# Patient Record
Sex: MALE | Race: WHITE
[De-identification: names, ages, dates, MRNs, and addresses within clinical notes are randomized per-mention and may not be internally consistent; named-entity substitution may affect disease eponyms.]

---

## 2017-04-12 ENCOUNTER — HOSPITAL ENCOUNTER (EMERGENCY)
Dept: HOSPITAL 26 - MED | Age: 22
LOS: 1 days | Discharge: HOME | End: 2017-04-13
Payer: COMMERCIAL

## 2017-04-12 VITALS — SYSTOLIC BLOOD PRESSURE: 120 MMHG | DIASTOLIC BLOOD PRESSURE: 68 MMHG

## 2017-04-12 VITALS — BODY MASS INDEX: 25.9 KG/M2 | HEIGHT: 71 IN | WEIGHT: 185 LBS

## 2017-04-12 DIAGNOSIS — J45.909: ICD-10-CM

## 2017-04-12 DIAGNOSIS — G40.909: Primary | ICD-10-CM

## 2017-04-12 LAB
ALBUMIN FLD-MCNC: 4.1 G/DL (ref 3.4–5)
ALP SERPL-CCNC: 124 U/L (ref 46–116)
ALT SERPL-CCNC: 36 U/L (ref 12–78)
ANION GAP SERPL CALCULATED.3IONS-SCNC: 11.7 MMOL/L (ref 8–16)
AST SERPL-CCNC: 28 U/L (ref 15–37)
BASOPHILS # BLD AUTO: 0.2 K/UL (ref 0–0.22)
BASOPHILS NFR BLD AUTO: 1.8 % (ref 0–2)
BILIRUB SERPL-MCNC: 0.3 MG/DL (ref 0–1)
BUN SERPL-MCNC: 14 MG/DL (ref 7–18)
CALCIUM SPEC-MCNC: 8.6 MG/DL (ref 8.5–10.1)
CHLORIDE SERPL-SCNC: 107 MMOL/L (ref 98–107)
CO2 SERPL-SCNC: 28 MMOL/L (ref 21–32)
CREAT SERPL-MCNC: 0.8 MG/DL (ref 0.6–1.3)
EOSINOPHIL # BLD AUTO: 0.2 K/UL (ref 0–0.4)
EOSINOPHIL NFR BLD AUTO: 2 % (ref 0–4)
ERYTHROCYTE [DISTWIDTH] IN BLOOD BY AUTOMATED COUNT: 11.6 % (ref 11.6–13.7)
GFR SERPL CREATININE-BSD FRML MDRD: 155 ML/MIN (ref 90–?)
GLUCOSE SERPL-MCNC: 112 MG/DL (ref 74–106)
HCT VFR BLD AUTO: 43.9 % (ref 36–52)
HGB BLD-MCNC: 15 G/DL (ref 12–18)
LYMPHOCYTES # BLD AUTO: 0.9 K/UL (ref 2–11.5)
LYMPHOCYTES NFR BLD AUTO: 8.5 % (ref 20.5–51.1)
MCH RBC QN AUTO: 31 PG (ref 27–31)
MCHC RBC AUTO-ENTMCNC: 34 G/DL (ref 33–37)
MCV RBC AUTO: 90 FL (ref 80–94)
MONOCYTES # BLD AUTO: 0.7 K/UL (ref 0.8–1)
MONOCYTES NFR BLD AUTO: 6.5 % (ref 1.7–9.3)
NEUTROPHILS # BLD AUTO: 8.3 K/UL (ref 1.8–7.7)
NEUTROPHILS NFR BLD AUTO: 81.2 % (ref 42.2–75.2)
PLATELET # BLD AUTO: 144 K/UL (ref 140–450)
POTASSIUM SERPL-SCNC: 3.7 MMOL/L (ref 3.5–5.1)
PROT SERPL-MCNC: 7.1 G/DL (ref 6.4–8.2)
RBC # BLD AUTO: 4.87 MIL/UL (ref 4.2–6.1)
SODIUM SERPL-SCNC: 143 MMOL/L (ref 136–145)
WBC # BLD AUTO: 10.3 K/UL (ref 4.8–10.8)

## 2017-04-12 PROCEDURE — 80305 DRUG TEST PRSMV DIR OPT OBS: CPT

## 2017-04-12 PROCEDURE — 70450 CT HEAD/BRAIN W/O DYE: CPT

## 2017-04-12 PROCEDURE — 96375 TX/PRO/DX INJ NEW DRUG ADDON: CPT

## 2017-04-12 PROCEDURE — 85025 COMPLETE CBC W/AUTO DIFF WBC: CPT

## 2017-04-12 PROCEDURE — 36415 COLL VENOUS BLD VENIPUNCTURE: CPT

## 2017-04-12 PROCEDURE — 96365 THER/PROPH/DIAG IV INF INIT: CPT

## 2017-04-12 PROCEDURE — 80053 COMPREHEN METABOLIC PANEL: CPT

## 2017-04-12 PROCEDURE — 99285 EMERGENCY DEPT VISIT HI MDM: CPT

## 2017-04-12 PROCEDURE — 81001 URINALYSIS AUTO W/SCOPE: CPT

## 2017-04-12 NOTE — NUR
GAUGE 20 IV LINE ESTABLISHED TO THE LEFT HAND. NS 1 LITER BOLUS, ZOFRAN 8 MG 
IVP AND KEPPRA 1 GM IVPB GIVEN AS ORDERED.

## 2017-04-13 VITALS — DIASTOLIC BLOOD PRESSURE: 62 MMHG | SYSTOLIC BLOOD PRESSURE: 112 MMHG

## 2017-04-13 LAB
AMPHET UR-MCNC: NEGATIVE NG/ML
APPEARANCE UR: CLEAR
BARBITURATES UR QL SCN: NEGATIVE NG/ML
BENZODIAZ UR QL SCN: NEGATIVE NG/ML
BILIRUB UR QL STRIP: NEGATIVE
BZE UR QL SCN: NEGATIVE NG/ML
CANNABINOIDS UR QL SCN: NEGATIVE NG/ML
COLOR UR: YELLOW
GLUCOSE UR STRIP-MCNC: NEGATIVE MG/DL
HGB UR QL STRIP: (no result)
LEUKOCYTE ESTERASE UR QL STRIP: NEGATIVE
NITRITE UR QL STRIP: NEGATIVE
OPIATES UR QL SCN: NEGATIVE NG/ML
PCP UR QL SCN: NEGATIVE NG/ML
PH UR STRIP: 7.5 [PH] (ref 5–9)
PROT UR QL STRIP: (no result)
RBC #/AREA URNS HPF: (no result) /HPF (ref 0–5)
SP GR UR STRIP: 1.02 (ref 1–1.03)
SQUAMOUS URNS QL MICRO: (no result) /LPF
UROBILINOGEN UR STRIP-MCNC: 0.2 EU/DL (ref 0.2–1)
WBC,URINE: (no result) /HPF (ref 0–5)

## 2017-04-13 NOTE — NUR
PATIENT STRONGLY ADVISED AGAINST DRIVING/OPERATING A VEHICLE OR A MACHINERY. 
PT. STATES HIS MOM DRIVES HIM AROUND.

## 2022-07-25 ENCOUNTER — HOSPITAL ENCOUNTER (EMERGENCY)
Dept: HOSPITAL 26 - MED | Age: 27
Discharge: HOME | End: 2022-07-25
Payer: COMMERCIAL

## 2022-07-25 VITALS — DIASTOLIC BLOOD PRESSURE: 79 MMHG | SYSTOLIC BLOOD PRESSURE: 144 MMHG

## 2022-07-25 VITALS — BODY MASS INDEX: 33.18 KG/M2 | HEIGHT: 71 IN | WEIGHT: 237 LBS

## 2022-07-25 DIAGNOSIS — J45.909: ICD-10-CM

## 2022-07-25 DIAGNOSIS — R56.9: Primary | ICD-10-CM

## 2022-07-25 DIAGNOSIS — R11.2: ICD-10-CM

## 2022-07-25 DIAGNOSIS — Z79.899: ICD-10-CM

## 2022-07-25 DIAGNOSIS — Z98.890: ICD-10-CM

## 2022-07-25 NOTE — NUR
26/M BIB SELF, REFERRED BY URGENT CARE. STATES HX OF EPILEPSY, STATING HE HAS 
BEEN HAVING "BRAIN SHAKES" AND THAT AFTER THE "BRAIN SHAKES" HE WOULD HAVE 
TONIC-CLONIC SEIZURES LASTING A FEW SECONDS. PER PT LAST SEIZURE WAS TODAY 
WITNESSED IN THE URGENT CARE LASTING A FEW SECONDS. DENIES LOC, DENIES HITTING 
HEAD. PER PT HE HAS BEEN HAVING "BRAIN SHAKES" FOR X5DAYS. WAS REFERRED TO ED 
TODAY FOR CT SCAN. PLACED ON CARDIAC MONITOR, SEIZURE PRECAUTIONS IN PLACE





PMH: EPILEPSY, HX OF MENINGITIS

NKA

-------------------------------------------------------------------------------

Addendum: 07/25/22 at 1105 by [email or web address]

-------------------------------------------------------------------------------

A/OX4, DENIES ANY AURA AT THIS TIME

## 2022-07-25 NOTE — NUR
Patient discharged with v/s stable. Written and verbal after care instructions 
ABOUT SEIZURE given and explained. 

Patient alert, oriented and verbalized understanding of instructions. 
Ambulatory with steady gait. All questions addressed prior to discharge. ID 
band removed. Patient advised to follow up with PMD. Rx of ATARAX given. 
Patient educated on indication of medication including possible reaction and 
side effects. Opportunity to ask questions provided and answered.